# Patient Record
Sex: FEMALE | Race: BLACK OR AFRICAN AMERICAN | Employment: FULL TIME | URBAN - METROPOLITAN AREA
[De-identification: names, ages, dates, MRNs, and addresses within clinical notes are randomized per-mention and may not be internally consistent; named-entity substitution may affect disease eponyms.]

---

## 2019-07-29 ENCOUNTER — OP HISTORICAL/CONVERTED ENCOUNTER (OUTPATIENT)
Dept: OTHER | Age: 59
End: 2019-07-29

## 2021-02-10 ENCOUNTER — OFFICE VISIT (OUTPATIENT)
Dept: ORTHOPEDIC SURGERY | Age: 61
End: 2021-02-10
Payer: MEDICARE

## 2021-02-10 VITALS — HEIGHT: 64 IN | WEIGHT: 190 LBS | BODY MASS INDEX: 32.44 KG/M2

## 2021-02-10 DIAGNOSIS — M75.52 BURSITIS OF LEFT SHOULDER: ICD-10-CM

## 2021-02-10 DIAGNOSIS — M25.512 LEFT SHOULDER PAIN, UNSPECIFIED CHRONICITY: Primary | ICD-10-CM

## 2021-02-10 PROCEDURE — G8417 CALC BMI ABV UP PARAM F/U: HCPCS | Performed by: ORTHOPAEDIC SURGERY

## 2021-02-10 PROCEDURE — G8427 DOCREV CUR MEDS BY ELIG CLIN: HCPCS | Performed by: ORTHOPAEDIC SURGERY

## 2021-02-10 PROCEDURE — 20611 DRAIN/INJ JOINT/BURSA W/US: CPT | Performed by: ORTHOPAEDIC SURGERY

## 2021-02-10 PROCEDURE — 3017F COLORECTAL CA SCREEN DOC REV: CPT | Performed by: ORTHOPAEDIC SURGERY

## 2021-02-10 PROCEDURE — 99203 OFFICE O/P NEW LOW 30 MIN: CPT | Performed by: ORTHOPAEDIC SURGERY

## 2021-02-10 PROCEDURE — G8510 SCR DEP NEG, NO PLAN REQD: HCPCS | Performed by: ORTHOPAEDIC SURGERY

## 2021-02-10 RX ORDER — LIDOCAINE HYDROCHLORIDE 10 MG/ML
9 INJECTION INFILTRATION; PERINEURAL ONCE
Status: COMPLETED | OUTPATIENT
Start: 2021-02-10 | End: 2021-02-10

## 2021-02-10 RX ORDER — TRIAMCINOLONE ACETONIDE 40 MG/ML
40 INJECTION, SUSPENSION INTRA-ARTICULAR; INTRAMUSCULAR ONCE
Status: COMPLETED | OUTPATIENT
Start: 2021-02-10 | End: 2021-02-10

## 2021-02-10 RX ADMIN — LIDOCAINE HYDROCHLORIDE 9 ML: 10 INJECTION INFILTRATION; PERINEURAL at 16:10

## 2021-02-10 RX ADMIN — TRIAMCINOLONE ACETONIDE 40 MG: 40 INJECTION, SUSPENSION INTRA-ARTICULAR; INTRAMUSCULAR at 16:10

## 2021-02-10 NOTE — PATIENT INSTRUCTIONS
Shoulder Pain: Care Instructions Your Care Instructions You can hurt your shoulder by using it too much during an activity, such as fishing or baseball. It can also happen as part of the everyday wear and tear of getting older. Shoulder injuries can be slow to heal, but your shoulder should get better with time. Your doctor may recommend a sling to rest your shoulder. If you have injured your shoulder, you may need testing and treatment. Follow-up care is a key part of your treatment and safety. Be sure to make and go to all appointments, and call your doctor if you are having problems. It's also a good idea to know your test results and keep a list of the medicines you take. How can you care for yourself at home? · Take pain medicines exactly as directed. ? If the doctor gave you a prescription medicine for pain, take it as prescribed. ? If you are not taking a prescription pain medicine, ask your doctor if you can take an over-the-counter medicine. ? Do not take two or more pain medicines at the same time unless the doctor told you to. Many pain medicines contain acetaminophen, which is Tylenol. Too much acetaminophen (Tylenol) can be harmful. · If your doctor recommends that you wear a sling, use it as directed. Do not take it off before your doctor tells you to. · Put ice or a cold pack on the sore area for 10 to 20 minutes at a time. Put a thin cloth between the ice and your skin. · If there is no swelling, you can put moist heat, a heating pad, or a warm cloth on your shoulder. Some doctors suggest alternating between hot and cold. · Rest your shoulder for a few days. If your doctor recommends it, you can then begin gentle exercise of the shoulder, but do not lift anything heavy. When should you call for help? Call 911 anytime you think you may need emergency care. For example, call if: 
  · You have chest pain or pressure. This may occur with: ? Sweating. ? Shortness of breath. ? Nausea or vomiting. ? Pain that spreads from the chest to the neck, jaw, or one or both shoulders or arms. ? Dizziness or lightheadedness. ? A fast or uneven pulse. After calling 911, chew 1 adult-strength aspirin. Wait for an ambulance. Do not try to drive yourself.  
  · Your arm or hand is cool or pale or changes color. Call your doctor now or seek immediate medical care if: 
  · You have signs of infection, such as: 
? Increased pain, swelling, warmth, or redness in your shoulder. ? Red streaks leading from a place on your shoulder. ? Pus draining from an area of your shoulder. ? Swollen lymph nodes in your neck, armpits, or groin. ? A fever. Watch closely for changes in your health, and be sure to contact your doctor if: 
  · You cannot use your shoulder.  
  · Your shoulder does not get better as expected. Where can you learn more? Go to http://www.gray.com/ Enter B165 in the search box to learn more about \"Shoulder Pain: Care Instructions. \" Current as of: March 2, 2020               Content Version: 12.6 © 5896-5794 Healthwise, Incorporated. Care instructions adapted under license by [employer] (which disclaims liability or warranty for this information). If you have questions about a medical condition or this instruction, always ask your healthcare professional. [place] [address] any warranty or liability for your use of this information.

## 2021-02-10 NOTE — PROGRESS NOTES
Name: Rubi Hernandez    : 1960     Service Dept: [address] [place] [address] and Sports Medicine    Patient's Pharmacies:    [facility]/pharmacy #[id number] Chucky Bryant  [address] [place] [address] [address] [id number]  Phone: [phone] Fax: [phone]       Chief Complaint   Patient presents with    Shoulder Pain    Wrist Pain        Visit Vitals  Ht 5' 4\" (1.626 m)   Wt 190 lb (86.2 kg)   BMI 32.61 kg/m²      Allergies   Allergen Reactions    Metoprolol Anaphylaxis      Current Facility-Administered Medications   Medication Dose Route Frequency Provider Last Rate Last Admin    lidocaine (XYLOCAINE) 10 mg/mL (1 %) injection 9 mL  9 mL Other ONCE Massimo Haley MD        triamcinolone acetonide (KENALOG-40) 40 mg/mL injection 40 mg  40 mg Intra artICUlar ONCE Massimo Haley MD          There is no problem list on file for this patient. Family History   Problem Relation Age of Onset    No Known Problems Mother     No Known Problems Father       Social History     Socioeconomic History    Marital status:      Spouse name: Not on file    Number of children: Not on file    Years of education: Not on file    Highest education level: Not on file   Tobacco Use    Smoking status: Never Smoker    Smokeless tobacco: Never Used   Substance and Sexual Activity    Alcohol use: Never     Frequency: Never    Drug use: Never    Sexual activity: Not Currently      History reviewed. No pertinent surgical history. Past Medical History:   Diagnosis Date    Diabetes ([facility] [address]     High cholesterol     Hypertension     Lung disease         I have reviewed and agree with [address] [place] [address] Nw and ROS and intake form in chart and the record furthermore I have reviewed prior medical record(s) regarding this patients care during this appointment.      Review of Systems:   Patient is a pleasant appearing individual, appropriately dressed, well hydrated, well nourished, who is alert, appropriately oriented for age, and in no acute distress with a normal gait and normal affect who does not appear to be in any significant pain. Physical Exam:  Left Shoulder - Grossly neurovascularly intact. Range of motion-Full passive, Active with impingement. No Point tenderness, Strength-weakness with abduction, some mild crepitation, No skin lesion are identified, No instabilty is noted, No apprehension. No Swelling. Right Shoulder - Grossly neurovascularly intact, Full Range of motion, No point tenderness, No weakness, No skin lesions, No Instability, No apprehension, No swelling. Procedure Documentation:    I discussed in detail the risks, benefits and complications of an injection which included but are not limited to infection, skin reactions, hot swollen joint, and anaphylaxis with the patient. The patient verbalized understanding and gave informed consent for the injection. The patient's left shoulder were prepped using sterile alcohol solution. A sterile needle was inserted into the left shoulder and the mixture of 9 mL Lidocaine 1%, 1 mL Kenalog 40 mg was injected under sterile technique. The needle was withdrawn and the puncture site sealed with a Band-Aid. Technique: Under sterile conditions a geolad ultrasound unit with a variable frequency (7.0-14.0 MHz) linear transducer was used to localize the placement of needle into the left joint. Findings: Successful needle placement for shoulder injection. Final images were taken and saved for permanent record. The patient tolerated the injection well. The patient was instructed to call the office immediately if there is any pain, redness, warmth, fever, or chills. Encounter Diagnoses     ICD-10-CM ICD-9-CM   1. Left shoulder pain, unspecified chronicity  M25.512 719.41   2.  Bursitis of left shoulder  M75.52 726.10       HPI:  The patient is here with a chief complaint of left shoulder pain, throbbing burning pain. It has been progressively getting worse. Nothing has helped. Pain is 10/10. ROS:  10-point review of systems is positive for nighttime pain, stiffness, and weakness. X-rays of the left shoulder are unremarkable. Assessment/Plan:  1. Left shoulder bursitis. Plan at this point, cortisone injection today, antiinflammatories as needed. We will see the patient back as needed as well and go from there. Return to Office: Follow-up and Dispositions    · Return for PRN. Scribed by Joshua Quintero MD as dictated by Jaida Cortes. Camilla Gould MD.  Documentation True and Accepted Massimo Gould MD

## 2021-02-10 NOTE — LETTER
Bandar Running 1960  
[id number]  
 
 
2/10/2021 I hereby authorize and direct Massimo Davila MD, Ira Bennett, and whomever he may designate as his associate to perform upon myself the following procedure: 
 
Injection of: Kenalog, Supartz, Euflexxa, Orthovisc in the Right/Left ____________________. If any unforeseen condition arises in the course of the procedure, I further authorize him and his associated and/or assistant(s) to do whatever he/she deems advisable. The nature, purpose, benefits, risks, side effects, likelihood of achieving goals, and potential problems that might occur during recuperation, risks for not receiving the proposed care, treatment and services and alternatives of the procedure have been fully explained to me by my physician including, but not limited to: 
 
Swelling, joint pain, skin pigment changes, worsening of condition, and failure to improve. I acknowledge that no guarantee or assurance has been made to me as to the results that may be obtained or the likelihood of success. _______________________________________ Signature of patient or authorized representative [employer] [facility] fax: [phone]

## 2021-12-07 ENCOUNTER — TRANSCRIBE ORDER (OUTPATIENT)
Dept: SCHEDULING | Age: 61
End: 2021-12-07

## 2021-12-07 DIAGNOSIS — Z12.31 SCREENING MAMMOGRAM, ENCOUNTER FOR: Primary | ICD-10-CM

## 2022-01-04 ENCOUNTER — HOSPITAL ENCOUNTER (OUTPATIENT)
Dept: MAMMOGRAPHY | Age: 62
Discharge: HOME | End: 2022-01-04
Payer: COMMERCIAL

## 2022-01-04 DIAGNOSIS — Z12.31 SCREENING MAMMOGRAM, ENCOUNTER FOR: ICD-10-CM

## 2022-01-04 PROCEDURE — 77063 BREAST TOMOSYNTHESIS BI: CPT

## 2023-02-13 ENCOUNTER — TRANSCRIBE ORDER (OUTPATIENT)
Dept: SCHEDULING | Age: 63
End: 2023-02-13

## 2023-02-13 DIAGNOSIS — Z12.31 ENCOUNTER FOR SCREENING MAMMOGRAM FOR MALIGNANT NEOPLASM OF BREAST: Primary | ICD-10-CM

## 2023-03-15 ENCOUNTER — OFFICE VISIT (OUTPATIENT)
Age: 63
End: 2023-03-15

## 2023-03-15 VITALS — BODY MASS INDEX: 32.44 KG/M2 | WEIGHT: 190 LBS | HEIGHT: 64 IN

## 2023-03-15 DIAGNOSIS — M25.511 RIGHT SHOULDER PAIN, UNSPECIFIED CHRONICITY: Primary | ICD-10-CM

## 2023-03-15 DIAGNOSIS — M75.51 BURSITIS OF RIGHT SHOULDER: ICD-10-CM

## 2023-03-15 PROBLEM — R51.9 HEADACHE: Status: ACTIVE | Noted: 2019-02-15

## 2023-03-15 PROBLEM — M48.062 LUMBAR STENOSIS WITH NEUROGENIC CLAUDICATION: Status: ACTIVE | Noted: 2022-12-07

## 2023-03-15 PROBLEM — R41.3 MEMORY DEFICIT: Status: ACTIVE | Noted: 2019-02-15

## 2023-03-15 PROBLEM — M79.602 LEFT ARM PAIN: Status: ACTIVE | Noted: 2022-12-07

## 2023-03-15 PROBLEM — M50.10 CERVICAL DISC DISORDER WITH RADICULOPATHY: Status: ACTIVE | Noted: 2022-12-07

## 2023-03-15 PROBLEM — E13.9 DIABETES 1.5, MANAGED AS TYPE 2 (HCC): Status: ACTIVE | Noted: 2022-12-07

## 2023-03-15 PROBLEM — G47.00 INSOMNIA: Status: ACTIVE | Noted: 2019-02-15

## 2023-03-15 PROBLEM — R09.02 HYPOXEMIA: Status: ACTIVE | Noted: 2022-12-07

## 2023-03-15 RX ORDER — BUDESONIDE AND FORMOTEROL FUMARATE DIHYDRATE 160; 4.5 UG/1; UG/1
2 AEROSOL RESPIRATORY (INHALATION)
COMMUNITY
Start: 2017-03-22

## 2023-03-15 RX ORDER — HYDROXYZINE PAMOATE 25 MG/1
CAPSULE ORAL EVERY 8 HOURS PRN
COMMUNITY
Start: 2016-09-07

## 2023-03-15 RX ORDER — RANITIDINE 150 MG/1
TABLET ORAL
COMMUNITY

## 2023-03-15 RX ORDER — FLUTICASONE FUROATE AND VILANTEROL 200; 25 UG/1; UG/1
1 POWDER RESPIRATORY (INHALATION) DAILY
COMMUNITY
Start: 2022-12-07 | End: 2023-12-07

## 2023-03-15 RX ORDER — FEXOFENADINE HCL 180 MG/1
TABLET ORAL
COMMUNITY

## 2023-03-15 RX ORDER — PRAVASTATIN SODIUM 40 MG
TABLET ORAL
COMMUNITY
Start: 2023-02-21

## 2023-03-15 RX ORDER — AMLODIPINE BESYLATE 5 MG/1
TABLET ORAL
COMMUNITY
Start: 2023-02-15

## 2023-03-15 RX ORDER — LEVOTHYROXINE SODIUM 0.1 MG/1
TABLET ORAL
COMMUNITY
Start: 2023-02-15

## 2023-03-15 RX ORDER — LEVOTHYROXINE SODIUM 0.1 MG/1
TABLET ORAL DAILY
COMMUNITY
Start: 2022-07-06

## 2023-03-15 RX ORDER — ASPIRIN 81 MG/1
TABLET ORAL DAILY
COMMUNITY
Start: 2016-04-09

## 2023-03-15 RX ORDER — GLIMEPIRIDE 2 MG/1
TABLET ORAL
COMMUNITY
Start: 2023-02-15

## 2023-03-15 RX ORDER — HYDROXYUREA 500 MG/1
CAPSULE ORAL
COMMUNITY
Start: 2023-01-02

## 2023-03-15 RX ORDER — ALBUTEROL SULFATE 90 UG/1
2 AEROSOL, METERED RESPIRATORY (INHALATION) EVERY 4 HOURS PRN
COMMUNITY
Start: 2017-03-21 | End: 2023-12-07

## 2023-03-15 RX ORDER — LIDOCAINE HYDROCHLORIDE 10 MG/ML
9 INJECTION, SOLUTION INFILTRATION; PERINEURAL ONCE
Status: COMPLETED | OUTPATIENT
Start: 2023-03-15 | End: 2023-03-15

## 2023-03-15 RX ORDER — NITROGLYCERIN 0.4 MG/1
TABLET SUBLINGUAL
COMMUNITY
Start: 2016-09-07

## 2023-03-15 RX ORDER — TRIAMCINOLONE ACETONIDE 40 MG/ML
40 INJECTION, SUSPENSION INTRA-ARTICULAR; INTRAMUSCULAR ONCE
Status: COMPLETED | OUTPATIENT
Start: 2023-03-15 | End: 2023-03-15

## 2023-03-15 RX ORDER — LEVOTHYROXINE SODIUM 0.12 MG/1
TABLET ORAL
COMMUNITY
Start: 2016-09-07

## 2023-03-15 RX ORDER — IPRATROPIUM BROMIDE AND ALBUTEROL SULFATE 2.5; .5 MG/3ML; MG/3ML
1 SOLUTION RESPIRATORY (INHALATION) EVERY 4 HOURS PRN
COMMUNITY

## 2023-03-15 RX ORDER — GLIMEPIRIDE 2 MG/1
TABLET ORAL 2 TIMES DAILY
COMMUNITY
Start: 2022-07-06

## 2023-03-15 RX ORDER — HYDROXYUREA 500 MG/1
CAPSULE ORAL 2 TIMES DAILY
COMMUNITY
Start: 2022-05-21

## 2023-03-15 RX ORDER — MAGNESIUM OXIDE 400 MG/1
1 TABLET ORAL 2 TIMES DAILY
COMMUNITY
Start: 2022-01-11

## 2023-03-15 RX ORDER — DILTIAZEM HYDROCHLORIDE 60 MG/1
TABLET, FILM COATED ORAL
COMMUNITY
Start: 2016-09-07

## 2023-03-15 RX ADMIN — LIDOCAINE HYDROCHLORIDE 9 ML: 10 INJECTION, SOLUTION INFILTRATION; PERINEURAL at 16:14

## 2023-03-15 RX ADMIN — TRIAMCINOLONE ACETONIDE 40 MG: 40 INJECTION, SUSPENSION INTRA-ARTICULAR; INTRAMUSCULAR at 16:15

## 2023-03-15 NOTE — PATIENT INSTRUCTIONS
Shoulder Bursitis: Care Instructions  Overview     Bursitis is inflammation of the bursa. A bursa is a small sac of fluid that cushions a joint and helps it move easily. A bursa sits under the highest point of your shoulder. You can get bursitis by overusing your shoulder, which can happen with activities such as lifting, pitching a ball, or painting. Symptoms of bursitis include pain when you move your arm. Your arm may hurt when you try to lift it, and the pain can reach down the side of your arm. You may have trouble sleeping because of the pain. Bursitis usually gets better if you avoid the activity that caused it. If pain lasts or gets worse despite home treatment, your doctor may draw fluid from the bursa through a needle. This may relieve your pain and help your doctor know if you have an infection. If so, your doctor will prescribe antibiotics. If you have inflammation only, you may get a corticosteroid shot to reduce swelling and pain. Sometimes surgery is needed to drain or remove the bursa. Follow-up care is a key part of your treatment and safety. Be sure to make and go to all appointments, and call your doctor if you are having problems. It's also a good idea to know your test results and keep a list of the medicines you take. How can you care for yourself at home? Put ice or a cold pack on your shoulder for 10 to 20 minutes at a time. Put a thin cloth between the ice and your skin. After 3 days of using ice, use heat on your shoulder. You can use a hot water bottle, a heating pad set on low, or a warm, moist towel. Some doctors suggest alternating between hot and cold. Rest your shoulder. Stop any activities that cause pain. Switch to activities that do not stress your shoulder. Take your medicines exactly as prescribed. Call your doctor if you think you are having a problem with your medicine. If your doctor recommends it, take anti-inflammatory medicines to reduce pain.  These include ibuprofen (Advil, Motrin) and naproxen (Aleve). Read and follow all instructions on the label. To prevent stiffness, gently move the shoulder joint through its full range of motion. As the pain gets better, keep doing range-of-motion exercises. Ask your doctor for exercises that will make the muscles around the shoulder joint stronger. Do these as directed. You can slowly return to the activity that caused the pain, but do it with less effort until you can do it without pain or swelling. Be sure to warm up before and stretch after you do the activity. When should you call for help? Call your doctor now or seek immediate medical care if:    You have a fever. You have increased swelling or redness in your shoulder. You cannot use your shoulder, or the pain in your shoulder gets worse. Watch closely for changes in your health, and be sure to contact your doctor if:    You have pain for 2 weeks or longer despite home treatment. Where can you learn more? Go to http://www.woods.com/ and enter M955 to learn more about \"Shoulder Bursitis: Care Instructions. \"  Current as of: March 9, 2022               Content Version: 13.5  © 1418-9030 Healthwise, Incorporated. Care instructions adapted under license by [facility] ([facility]). If you have questions about a medical condition or this instruction, always ask your healthcare professional. [address] any warranty or liability for your use of this information.

## 2023-03-15 NOTE — PROGRESS NOTES
Name: Odette Jean    : 1960     [address] [place]. \[facility].\""  [address], [address] [facility] [address]  Dept: [phone]  Dept Fax: [phone]     Chief Complaint   Patient presents with    Shoulder Pain        Ht 5' 4\" (1.626 m)   Wt 190 lb (86.2 kg)   BMI 32.61 kg/m²      Allergies   Allergen Reactions    Atorvastatin Myalgia and Other (See Comments)    Gluten Shortness Of Breath    Gluten Meal Shortness Of Breath    Metoprolol Anaphylaxis and Shortness Of Breath     \"went into cardiac arrest\"  Reaction Type: Allergy  \"went into cardiac arrest\"      Prednisone Palpitations     PO prednisone sometimes makes her face feel numb. Reaction Type: Allergy  PO prednisone sometimes makes her face feel numb.           Current Outpatient Medications   Medication Sig Dispense Refill    albuterol sulfate HFA (PROVENTIL;VENTOLIN;PROAIR) 108 (90 Base) MCG/ACT inhaler Inhale 2 puffs into the lungs every 4 hours as needed      amLODIPine (NORVASC) 5 MG tablet TAKE 1 TABLET BY MOUTH EVERY DAY      aspirin 81 MG EC tablet Take by mouth daily      budesonide-formoterol (SYMBICORT) 160-4.5 MCG/ACT AERO Inhale 2 puffs into the lungs      camphor-menthol (SARNA) 0.5-0.5 % lotion Apply topically as needed      dilTIAZem (CARDIZEM) 60 MG tablet Take by mouth      fexofenadine (ALLEGRA) 180 MG tablet Take by mouth      fluticasone furoate-vilanterol (BREO ELLIPTA) 200-25 MCG/ACT AEPB inhaler Inhale 1 puff into the lungs daily      glimepiride (AMARYL) 2 MG tablet TAKE 1 TABLET BY MOUTH TWICE A DAY      glimepiride (AMARYL) 2 MG tablet Take by mouth 2 times daily      hydroxyurea (HYDREA) 500 MG chemo capsule TAKE 1 CAPSULE BY MOUTH TWICE A DAY      hydroxyurea (HYDREA) 500 MG chemo capsule Take by mouth 2 times daily      levothyroxine (SYNTHROID) 125 MCG tablet Take by mouth every morning (before breakfast)      ipratropium-albuterol (DUONEB) 0.5-2.5 (3) MG/3ML SOLN nebulizer solution Inhale 1 ampule into the lungs every 4 hours as needed      hydrOXYzine pamoate (VISTARIL) 25 MG capsule Take by mouth every 8 hours as needed      levothyroxine (SYNTHROID) 100 MCG tablet Take by mouth daily      levothyroxine (SYNTHROID) 100 MCG tablet TAKE ONE TABLET BY MOUTH ONCE DAILY      nitroGLYCERIN (NITROSTAT) 0.4 MG SL tablet Place under the tongue      metFORMIN (GLUCOPHAGE) 500 MG tablet TAKE ONE TABLET BY MOUTH TWICE A DAY      magnesium oxide (MAG-OX) 400 MG tablet Take 1 tablet by mouth 2 times daily      pravastatin (PRAVACHOL) 40 MG tablet TAKE 1 TABLET BY MOUTH EVERYDAY AT BEDTIME      raNITIdine (ZANTAC) 150 MG tablet Take by mouth       No current facility-administered medications for this visit.       Patient Active Problem List   Diagnosis    Hyperlipidemia    Headache    Diabetes 1.5, managed as type 2 ([facility] [address]    Depression    Coronary atherosclerosis    Cervical disc disorder with radiculopathy    Asthma    Lumbar stenosis with neurogenic claudication    Left arm pain    Interstitial lung disease (HCC)    Insomnia    Hypoxemia    Hypothyroidism    Hypertension    PVC (premature ventricular contraction)    Pruritus    Obesity, Class II, BMI 35-39.9, with comorbidity    Memory deficit    Type 2 diabetes mellitus ([address]    Thrombocythemia    Pulmonary sarcoidosis ([facility] [address]      Family History   Problem Relation Age of Onset    No Known Problems Mother     No Known Problems Father       Social History     Socioeconomic History    Marital status:      Spouse name: None    Number of children: None    Years of education: None    Highest education level: None   Tobacco Use    Smoking status: Never    Smokeless tobacco: Never   Substance and Sexual Activity    Alcohol use: Never      Past Surgical History:   Procedure Laterality Date    TRACHEOSTOMY        Past Medical History:   Diagnosis Date    Arthritis     Diabetes mellitus ([address]     Heart disease Hypercholesteremia     Hypertension     Pneumonia     Thyroid disease         I have reviewed and agree with PFSH and ROS and intake form in chart and the record furthermore I have reviewed prior medical record(s) regarding this patients care during this appointment.     Review of Systems:   Patient is a pleasant appearing individual, appropriately dressed, well hydrated, well nourished, who is alert, appropriately oriented for age, and in no acute distress with a normal gait and normal affect who does not appear to be in any significant pain.    Physical Exam:  Right Shoulder - Grossly neurovascularly intact. Range of motion-Full passive, Active with impingement. No Point tenderness, Strength-weakness with abduction, some mild crepitation, No skin lesion are identified, No instabilty is noted, No apprehension. No Swelling.     Left Shoulder - Grossly neurovascularly intact, Full Range of motion, No point tenderness, No weakness, No skin lesions, No Instability, No apprehension, No swelling.     Procedure Documentation:    I discussed in detail the risks, benefits and complications of an injection which included but are not limited to infection, skin reactions, hot swollen joint, and anaphylaxis with the patient. The patient verbalized understanding and gave informed consent for the injection. The patient's right shoulder were prepped using sterile alcohol solution. A sterile needle was inserted into the right shoulder and the mixture of 9 mL Lidocaine 1%, 1 mL Kenalog 40 mg was injected under sterile technique. The needle was withdrawn and the puncture site sealed with a Band-Aid.      Technique: Under sterile conditions a [employer] ultrasound unit with a variable frequency (7.0-14.0 MHz) linear transducer was used to localize the placement of needle into the right joint.    Findings: Successful needle placement for shoulder injection.  Final images were taken and saved for permanent record.      The patient tolerated the  injection well. The patient was instructed to call the office immediately if there is any pain, redness, warmth, fever, or chills. Visit Diagnoses         Codes    Right shoulder pain, unspecified chronicity    -  Primary M25.511    Bursitis of right shoulder     M75.51               HPI:  The patient is here with right shoulder pain, throbbing burning pain, progressively getting worse. Pain is 10/10. Continues to have difficulty with it. Never had injection. X-rays of the right shoulder are unremarkable. Assessment/Plan:  1. Right shoulder bursitis. We will try cortisone injection today. If it helps, that is all we need to do. We will go from there. As part of continued conservative pain management options the patient was advised to utilize Tylenol or OTC NSAIDS as long as it is not medically contraindicated. Return to Office: Follow-up and Dispositions    Return if symptoms worsen or fail to improve. Administrations This Visit       lidocaine 1 % injection 9 mL       Admin Date  03/15/2023 Action  Given Dose  9 mL Route  Other Administered By  Skip Myers LPN              triamcinolone acetonide (KENALOG-40) injection 40 mg       Admin Date  03/15/2023 Action  Given Dose  40 mg Route  Other Administered By  Skip Myers LPN                   Scribed by Skip Myers LPN as dictated by [facility] [place] GIORGIO Pineda MD.  Documentation, performed by, True and Accepted Lowell Pineda MD

## 2023-03-15 NOTE — LETTER
Se Carbajal   1960   [id number]       3/15/2023       I hereby authorize and direct Lowell Petersen MD, Corrina Cornejo, and whomever he may designate as his associate to perform upon myself the following procedure:    Injection of: Kenalog, Supartz, Euflexxa, Orthovisc in the Right/Left ____________________. If any unforeseen condition arises in the course of the procedure, I further authorize him and his associated and/or assistant(s) to do whatever he/she deems advisable. The nature, purpose, benefits, risks, side effects, likelihood of achieving goals, and potential problems that might occur during recuperation, risks for not receiving the proposed care, treatment and services and alternatives of the procedure have been fully explained to me by my physician including, but not limited to:    Swelling, joint pain, skin pigment changes, worsening of condition, and failure to improve. I acknowledge that no guarantee or assurance has been made to me as to the results that may be obtained or the likelihood of success.                 _______________________________________     Signature of patient or authorized representative                [employer] [facility] fax: [phone]

## 2023-03-22 ENCOUNTER — HOSPITAL ENCOUNTER (OUTPATIENT)
Dept: MAMMOGRAPHY | Age: 63
Discharge: HOME | End: 2023-03-22
Attending: FAMILY MEDICINE
Payer: MEDICARE

## 2023-03-22 DIAGNOSIS — Z12.31 ENCOUNTER FOR SCREENING MAMMOGRAM FOR MALIGNANT NEOPLASM OF BREAST: ICD-10-CM

## 2023-03-22 PROCEDURE — 77063 BREAST TOMOSYNTHESIS BI: CPT

## 2024-05-01 ENCOUNTER — TRANSCRIBE ORDERS (OUTPATIENT)
Facility: HOSPITAL | Age: 64
End: 2024-05-01

## 2024-05-01 DIAGNOSIS — Z12.31 SCREENING MAMMOGRAM, ENCOUNTER FOR: Primary | ICD-10-CM
